# Patient Record
Sex: MALE | Race: WHITE
[De-identification: names, ages, dates, MRNs, and addresses within clinical notes are randomized per-mention and may not be internally consistent; named-entity substitution may affect disease eponyms.]

---

## 2021-08-09 ENCOUNTER — HOSPITAL ENCOUNTER (INPATIENT)
Dept: HOSPITAL 54 - ER | Age: 66
LOS: 1 days | Discharge: NURSING, REHAB OR LONG-TERM CARE | DRG: 203 | End: 2021-08-10
Attending: INTERNAL MEDICINE | Admitting: INTERNAL MEDICINE
Payer: COMMERCIAL

## 2021-08-09 VITALS — HEIGHT: 68 IN | WEIGHT: 127 LBS | BODY MASS INDEX: 19.25 KG/M2

## 2021-08-09 VITALS — DIASTOLIC BLOOD PRESSURE: 70 MMHG | SYSTOLIC BLOOD PRESSURE: 123 MMHG

## 2021-08-09 DIAGNOSIS — I10: ICD-10-CM

## 2021-08-09 DIAGNOSIS — H54.62: ICD-10-CM

## 2021-08-09 DIAGNOSIS — J20.9: ICD-10-CM

## 2021-08-09 DIAGNOSIS — Z79.51: ICD-10-CM

## 2021-08-09 DIAGNOSIS — R07.89: Primary | ICD-10-CM

## 2021-08-09 DIAGNOSIS — J45.909: ICD-10-CM

## 2021-08-09 DIAGNOSIS — Z20.822: ICD-10-CM

## 2021-08-09 DIAGNOSIS — E78.5: ICD-10-CM

## 2021-08-09 DIAGNOSIS — B35.1: ICD-10-CM

## 2021-08-09 DIAGNOSIS — Z79.899: ICD-10-CM

## 2021-08-09 DIAGNOSIS — G31.84: ICD-10-CM

## 2021-08-09 DIAGNOSIS — Z79.82: ICD-10-CM

## 2021-08-09 DIAGNOSIS — G40.909: ICD-10-CM

## 2021-08-09 LAB
ALBUMIN SERPL BCP-MCNC: 3.9 G/DL (ref 3.4–5)
ALP SERPL-CCNC: 66 U/L (ref 46–116)
ALT SERPL W P-5'-P-CCNC: 22 U/L (ref 12–78)
AST SERPL W P-5'-P-CCNC: 15 U/L (ref 15–37)
BASOPHILS # BLD AUTO: 0 K/UL (ref 0–0.2)
BASOPHILS NFR BLD AUTO: 1.1 % (ref 0–2)
BILIRUB DIRECT SERPL-MCNC: 0.1 MG/DL (ref 0–0.2)
BILIRUB SERPL-MCNC: 0.4 MG/DL (ref 0.2–1)
BUN SERPL-MCNC: 25 MG/DL (ref 7–18)
CALCIUM SERPL-MCNC: 9 MG/DL (ref 8.5–10.1)
CHLORIDE SERPL-SCNC: 106 MMOL/L (ref 98–107)
CO2 SERPL-SCNC: 25 MMOL/L (ref 21–32)
CREAT SERPL-MCNC: 0.9 MG/DL (ref 0.6–1.3)
EOSINOPHIL NFR BLD AUTO: 2.2 % (ref 0–6)
GLUCOSE SERPL-MCNC: 114 MG/DL (ref 74–106)
HCT VFR BLD AUTO: 36 % (ref 39–51)
HGB BLD-MCNC: 12.7 G/DL (ref 13.5–17.5)
LYMPHOCYTES NFR BLD AUTO: 1.5 K/UL (ref 0.8–4.8)
LYMPHOCYTES NFR BLD AUTO: 32.5 % (ref 20–44)
MCHC RBC AUTO-ENTMCNC: 35 G/DL (ref 31–36)
MCV RBC AUTO: 100 FL (ref 80–96)
MONOCYTES NFR BLD AUTO: 0.4 K/UL (ref 0.1–1.3)
MONOCYTES NFR BLD AUTO: 8.9 % (ref 2–12)
NEUTROPHILS # BLD AUTO: 2.5 K/UL (ref 1.8–8.9)
NEUTROPHILS NFR BLD AUTO: 55.3 % (ref 43–81)
PLATELET # BLD AUTO: 172 K/UL (ref 150–450)
POTASSIUM SERPL-SCNC: 3.4 MMOL/L (ref 3.5–5.1)
PROT SERPL-MCNC: 7.6 G/DL (ref 6.4–8.2)
RBC # BLD AUTO: 3.63 MIL/UL (ref 4.5–6)
SODIUM SERPL-SCNC: 143 MMOL/L (ref 136–145)
WBC NRBC COR # BLD AUTO: 4.5 K/UL (ref 4.3–11)

## 2021-08-09 PROCEDURE — G0378 HOSPITAL OBSERVATION PER HR: HCPCS

## 2021-08-09 PROCEDURE — C9803 HOPD COVID-19 SPEC COLLECT: HCPCS

## 2021-08-09 NOTE — NUR
PT BIBRQA FROM skilled nursing C/O R SIDED CHEST PAIN THAT STARED THIS MORNING. PT DENIES 
SOB,  PT GOWNED AND PLACED ON MONITOR. AAOX3. STABLE VITALS. AWAITING MD SWIFT.

## 2021-08-09 NOTE — NUR
PT B[facility]RA [facility] C/O R SIDED CHEST PAIN SINCE THIS MORNING.  STATES BEEN IN 
PAIN SINCE THEN. DENIES ANY SHORTNESS OF BREATH PTA. GOWNED AND PLACED ON 
MONITOR. STABLE VITALS PTA. AWAITING MD SWIFT.

## 2021-08-10 VITALS — DIASTOLIC BLOOD PRESSURE: 71 MMHG | SYSTOLIC BLOOD PRESSURE: 128 MMHG

## 2021-08-10 VITALS — SYSTOLIC BLOOD PRESSURE: 148 MMHG | DIASTOLIC BLOOD PRESSURE: 75 MMHG

## 2021-08-10 VITALS — DIASTOLIC BLOOD PRESSURE: 75 MMHG | SYSTOLIC BLOOD PRESSURE: 127 MMHG

## 2021-08-10 VITALS — DIASTOLIC BLOOD PRESSURE: 63 MMHG | SYSTOLIC BLOOD PRESSURE: 113 MMHG

## 2021-08-10 LAB
BASOPHILS # BLD AUTO: 0 K/UL (ref 0–0.2)
BASOPHILS NFR BLD AUTO: 1 % (ref 0–2)
CHOLEST SERPL-MCNC: 132 MG/DL (ref ?–200)
EOSINOPHIL NFR BLD AUTO: 3.2 % (ref 0–6)
HCT VFR BLD AUTO: 37 % (ref 39–51)
HDLC SERPL-MCNC: 40 MG/DL (ref 40–60)
HGB BLD-MCNC: 13 G/DL (ref 13.5–17.5)
LDLC SERPL DIRECT ASSAY-MCNC: 72 MG/DL (ref 0–99)
LYMPHOCYTES NFR BLD AUTO: 1.3 K/UL (ref 0.8–4.8)
LYMPHOCYTES NFR BLD AUTO: 39.5 % (ref 20–44)
MCHC RBC AUTO-ENTMCNC: 35 G/DL (ref 31–36)
MCV RBC AUTO: 101 FL (ref 80–96)
MONOCYTES NFR BLD AUTO: 0.3 K/UL (ref 0.1–1.3)
MONOCYTES NFR BLD AUTO: 8.4 % (ref 2–12)
NEUTROPHILS # BLD AUTO: 1.5 K/UL (ref 1.8–8.9)
NEUTROPHILS NFR BLD AUTO: 47.9 % (ref 43–81)
PLATELET # BLD AUTO: 158 K/UL (ref 150–450)
RBC # BLD AUTO: 3.72 MIL/UL (ref 4.5–6)
TRIGL SERPL-MCNC: 129 MG/DL (ref 30–150)
WBC NRBC COR # BLD AUTO: 3.2 K/UL (ref 4.3–11)

## 2021-08-10 NOTE — NUR
TELE RN OPENING NOTES

RECEIVED PATIENT ON BED, AWAKE, A/O X4. ON ROOM AIR WITH NO COMPLAINTS OF SOB AND PAIN. NOT 
IN DISTRESS. WITH IV ACCESS AT LEFT WRIST #22, SALINE LOCKED. SAFETY MEASURES IN PLACED. 
CALL LIGHT WITHIN REACH. BED ON LOWEST AND LOCKED POSITION. SIDE RAILS UP X2. WILL CONTINUE 
TO MONITOR.









.

## 2021-08-10 NOTE — NUR
TELE RN DISCHARGE NOTES

PATIENT ON BED WITH NO COMPLAINT OF PAIN. NO SOB NOTED, NOT IN DISTRESS. FOR DISHAGRE PER 
DOCTOR JOSE RAMON'S ORDER. CTCA DONE AND RELAYED RESULTS TO DOCTOR SENA WHO CLEARED THE PATIENT 
FOR DISCHARGE. PRESCRIPTION GIVEN TO PATIENT AND DISCHARGE INSTRUCTION PROVIDED. PATIENT 
VERBALIZED UNDERSTANDING. ALL BELONGINGS CHECKED. [facility] INFORMED THAT THE 
PATIENT IS COMING BACK. [facility] CONDITION. INSTRUCTED TO FOLLOW UP WITH PCP 1 WEEK AFTER DISCHARGE.

## 2021-08-10 NOTE — NUR
pt consented to CTA heart; AOx4 denies CP or SOB; given Lopressor 5mg IVPx1 and NTG SLx1; 
tolerated procedure; VSS; report given to floor DIAN Hidalgo; trnsferred back to floor via 
wheelchair

-------------------------------------------------------------------------------

Addendum: 08/10/21 at 0959 by GABI SCHULZ RN

-------------------------------------------------------------------------------

report given to DIAN Rawls; all questions answered

## 2022-05-31 ENCOUNTER — HOSPITAL ENCOUNTER (EMERGENCY)
Dept: HOSPITAL 12 - ER | Age: 67
LOS: 1 days | Discharge: NURSING, REHAB OR LONG-TERM CARE | End: 2022-06-01
Payer: MEDICARE

## 2022-05-31 VITALS — BODY MASS INDEX: 21.19 KG/M2 | WEIGHT: 135 LBS | HEIGHT: 67 IN

## 2022-05-31 DIAGNOSIS — G40.909: Primary | ICD-10-CM

## 2022-05-31 DIAGNOSIS — K64.4: ICD-10-CM

## 2022-05-31 DIAGNOSIS — H54.62: ICD-10-CM

## 2022-05-31 LAB
ALP SERPL-CCNC: 73 U/L (ref 50–136)
ALT SERPL W/O P-5'-P-CCNC: 32 U/L (ref 16–63)
AST SERPL-CCNC: 30 U/L (ref 15–37)
BILIRUB DIRECT SERPL-MCNC: 0.1 MG/DL (ref 0–0.2)
BILIRUB SERPL-MCNC: 0.5 MG/DL (ref 0.2–1)
BUN SERPL-MCNC: 17 MG/DL (ref 7–18)
CHLORIDE SERPL-SCNC: 107 MMOL/L (ref 98–107)
CO2 SERPL-SCNC: 28 MMOL/L (ref 21–32)
CREAT SERPL-MCNC: 1.2 MG/DL (ref 0.6–1.3)
ETHANOL SERPL-MCNC: < 3 MG/DL (ref 0–0)
GLUCOSE SERPL-MCNC: 85 MG/DL (ref 74–106)
HCT VFR BLD AUTO: 32.5 % (ref 36.7–47.1)
MCH RBC QN AUTO: 34.9 UUG (ref 23.8–33.4)
MCV RBC AUTO: 97.9 FL (ref 73–96.2)
PLATELET # BLD AUTO: 169 K/UL (ref 152–348)
POTASSIUM SERPL-SCNC: 3.5 MMOL/L (ref 3.5–5.1)
WS STN SPEC: 7.1 G/DL (ref 6.4–8.2)

## 2022-05-31 PROCEDURE — A4663 DIALYSIS BLOOD PRESSURE CUFF: HCPCS

## 2022-05-31 PROCEDURE — 80076 HEPATIC FUNCTION PANEL: CPT

## 2022-05-31 PROCEDURE — 96372 THER/PROPH/DIAG INJ SC/IM: CPT

## 2022-05-31 PROCEDURE — 36415 COLL VENOUS BLD VENIPUNCTURE: CPT

## 2022-05-31 PROCEDURE — 80320 DRUG SCREEN QUANTALCOHOLS: CPT

## 2022-05-31 PROCEDURE — 70450 CT HEAD/BRAIN W/O DYE: CPT

## 2022-05-31 PROCEDURE — 99284 EMERGENCY DEPT VISIT MOD MDM: CPT

## 2022-05-31 PROCEDURE — 85730 THROMBOPLASTIN TIME PARTIAL: CPT

## 2022-05-31 PROCEDURE — 80048 BASIC METABOLIC PNL TOTAL CA: CPT

## 2022-05-31 PROCEDURE — G0480 DRUG TEST DEF 1-7 CLASSES: HCPCS

## 2022-05-31 PROCEDURE — 80299 QUANTITATIVE ASSAY DRUG: CPT

## 2022-05-31 PROCEDURE — 85025 COMPLETE CBC W/AUTO DIFF WBC: CPT

## 2022-06-01 VITALS — DIASTOLIC BLOOD PRESSURE: 59 MMHG | SYSTOLIC BLOOD PRESSURE: 120 MMHG
